# Patient Record
Sex: FEMALE | URBAN - METROPOLITAN AREA
[De-identification: names, ages, dates, MRNs, and addresses within clinical notes are randomized per-mention and may not be internally consistent; named-entity substitution may affect disease eponyms.]

---

## 2023-01-06 ENCOUNTER — OFFICE VISIT (OUTPATIENT)
Dept: URBAN - METROPOLITAN AREA CLINIC 6 | Facility: CLINIC | Age: 88
End: 2023-01-06
Payer: COMMERCIAL

## 2023-01-06 DIAGNOSIS — H17.89 OTHER CORNEAL SCARS AND OPACITIES: ICD-10-CM

## 2023-01-06 DIAGNOSIS — H04.123 TEAR FILM INSUFFICIENCY OF BILATERAL LACRIMAL GLANDS: ICD-10-CM

## 2023-01-06 DIAGNOSIS — H43.811 VITREOUS DEGENERATION, RIGHT EYE: ICD-10-CM

## 2023-01-06 DIAGNOSIS — H40.013 OPEN ANGLE WITH BORDERLINE FINDINGS, LOW RISK, BILATERAL: Primary | ICD-10-CM

## 2023-01-06 DIAGNOSIS — H35.373 PUCKERING OF MACULA, BILATERAL: ICD-10-CM

## 2023-01-06 PROCEDURE — 92014 COMPRE OPH EXAM EST PT 1/>: CPT | Performed by: OPTOMETRIST

## 2023-01-06 PROCEDURE — 92250 FUNDUS PHOTOGRAPHY W/I&R: CPT | Performed by: OPTOMETRIST

## 2023-01-06 RX ORDER — LOSARTAN POTASSIUM AND HYDROCHLOROTHIAZIDE 100; 25 MG/1; MG/1
TABLET, FILM COATED ORAL AS DIRECTED
Refills: 0 | Status: ACTIVE
Start: 2023-01-06

## 2023-01-06 RX ORDER — LEVOTHYROXINE SODIUM 50 UG/1
TABLET ORAL AS DIRECTED
Refills: 0 | Status: ACTIVE
Start: 2023-01-06

## 2023-01-06 ASSESSMENT — INTRAOCULAR PRESSURE
OS: 8
OD: 9

## 2023-01-06 NOTE — IMPRESSION/PLAN
Impression: Open angle with borderline findings, low risk, bilateral: H40.013. Plan: Low risk glaucoma suspect due to large cup to disc ratios in both eyes - Fundus photos ordered today show no change from previous. HVF 24-2 and RNFL were ordered for next visit. The pathophysiology of glaucoma and the difference between having glaucoma and being a glaucoma suspect were reviewed with the patient. All of the patients questions were answered and they stated they understand their finding.

## 2023-04-25 ENCOUNTER — OFFICE VISIT (OUTPATIENT)
Dept: URBAN - METROPOLITAN AREA CLINIC 6 | Facility: CLINIC | Age: 88
End: 2023-04-25
Payer: COMMERCIAL

## 2023-04-25 DIAGNOSIS — H40.013 OPEN ANGLE WITH BORDERLINE FINDINGS, LOW RISK, BILATERAL: Primary | ICD-10-CM

## 2023-04-25 PROCEDURE — 76514 ECHO EXAM OF EYE THICKNESS: CPT | Performed by: OPTOMETRIST

## 2023-04-25 PROCEDURE — 92133 CPTRZD OPH DX IMG PST SGM ON: CPT | Performed by: OPTOMETRIST

## 2023-04-25 PROCEDURE — 92012 INTRM OPH EXAM EST PATIENT: CPT | Performed by: OPTOMETRIST

## 2023-04-25 PROCEDURE — 92083 EXTENDED VISUAL FIELD XM: CPT | Performed by: OPTOMETRIST

## 2023-04-25 ASSESSMENT — INTRAOCULAR PRESSURE
OD: 7
OS: 9

## 2023-04-25 NOTE — IMPRESSION/PLAN
Impression: Open angle with borderline findings, low risk, bilateral: H40.013. Plan: Low risk glaucoma suspect OU - RNFL OCT scan ordered today shows average thicknesses of  79/86 and no focal thin quadrants in either eye. Humphery Visual Field 24-2 ordered was unreliable both eyes and was without correlating patterns in either eye. Intraocular pressure is normal and pachymetry revealed normal corneal thicknesses. Fundus photos were ordered for next visit. The results of testing was reviewed with the patient. The patients questions were answered and stated they understood the findings and need for regular monitoring.

## 2023-10-26 ENCOUNTER — OFFICE VISIT (OUTPATIENT)
Dept: URBAN - METROPOLITAN AREA CLINIC 1 | Facility: CLINIC | Age: 88
End: 2023-10-26
Payer: COMMERCIAL

## 2023-10-26 DIAGNOSIS — H47.233 GLAUCOMATOUS OPTIC ATROPHY, BILATERAL: ICD-10-CM

## 2023-10-26 DIAGNOSIS — H35.373 PUCKERING OF MACULA, BILATERAL: Primary | ICD-10-CM

## 2023-10-26 DIAGNOSIS — H40.013 OPEN ANGLE WITH BORDERLINE FINDINGS, LOW RISK, BILATERAL: ICD-10-CM

## 2023-10-26 PROCEDURE — 92250 FUNDUS PHOTOGRAPHY W/I&R: CPT

## 2023-10-26 PROCEDURE — 99212 OFFICE O/P EST SF 10 MIN: CPT

## 2023-10-26 ASSESSMENT — INTRAOCULAR PRESSURE
OS: 12
OD: 13

## 2024-05-09 ENCOUNTER — OFFICE VISIT (OUTPATIENT)
Dept: URBAN - METROPOLITAN AREA CLINIC 1 | Facility: CLINIC | Age: 89
End: 2024-05-09
Payer: MEDICARE

## 2024-05-09 DIAGNOSIS — H17.89 OTHER CORNEAL SCARS AND OPACITIES: ICD-10-CM

## 2024-05-09 DIAGNOSIS — H40.013 OPEN ANGLE WITH BORDERLINE FINDINGS, LOW RISK, BILATERAL: Primary | ICD-10-CM

## 2024-05-09 DIAGNOSIS — H04.123 TEAR FILM INSUFFICIENCY OF BILATERAL LACRIMAL GLANDS: ICD-10-CM

## 2024-05-09 DIAGNOSIS — H47.233 GLAUCOMATOUS OPTIC ATROPHY, BILATERAL: ICD-10-CM

## 2024-05-09 PROCEDURE — 99212 OFFICE O/P EST SF 10 MIN: CPT

## 2024-05-09 PROCEDURE — 92133 CPTRZD OPH DX IMG PST SGM ON: CPT

## 2024-05-09 ASSESSMENT — INTRAOCULAR PRESSURE
OS: 15
OD: 14